# Patient Record
Sex: MALE | Race: WHITE | Employment: OTHER | URBAN - METROPOLITAN AREA
[De-identification: names, ages, dates, MRNs, and addresses within clinical notes are randomized per-mention and may not be internally consistent; named-entity substitution may affect disease eponyms.]

---

## 2021-11-08 ENCOUNTER — HOSPITAL ENCOUNTER (EMERGENCY)
Age: 66
Discharge: HOME | End: 2021-11-08
Attending: EMERGENCY MEDICINE
Payer: MEDICARE

## 2021-11-08 VITALS
RESPIRATION RATE: 16 BRPM | OXYGEN SATURATION: 100 % | BODY MASS INDEX: 22.4 KG/M2 | WEIGHT: 160 LBS | TEMPERATURE: 97.8 F | HEIGHT: 71 IN | DIASTOLIC BLOOD PRESSURE: 67 MMHG | HEART RATE: 60 BPM | SYSTOLIC BLOOD PRESSURE: 129 MMHG

## 2021-11-08 DIAGNOSIS — T40.601A OPIATE OVERDOSE, ACCIDENTAL OR UNINTENTIONAL, INITIAL ENCOUNTER (HCC): Primary | ICD-10-CM

## 2021-11-08 LAB
A/G RATIO: 2 (ref 1.1–2.2)
ALBUMIN SERPL-MCNC: 3.9 G/DL (ref 3.4–5)
ALP BLD-CCNC: 86 U/L (ref 40–129)
ALT SERPL-CCNC: 8 U/L (ref 10–40)
AMPHETAMINE SCREEN, URINE: ABNORMAL
ANION GAP SERPL CALCULATED.3IONS-SCNC: 11 MMOL/L (ref 3–16)
AST SERPL-CCNC: 7 U/L (ref 15–37)
BACTERIA: ABNORMAL /HPF
BARBITURATE SCREEN URINE: ABNORMAL
BASE EXCESS VENOUS: -1.7 MMOL/L (ref -3–3)
BASOPHILS ABSOLUTE: 0 K/UL (ref 0–0.2)
BASOPHILS RELATIVE PERCENT: 0.4 %
BENZODIAZEPINE SCREEN, URINE: ABNORMAL
BILIRUB SERPL-MCNC: 0.9 MG/DL (ref 0–1)
BILIRUBIN URINE: NEGATIVE
BLOOD, URINE: ABNORMAL
BUN BLDV-MCNC: 16 MG/DL (ref 7–20)
CALCIUM SERPL-MCNC: 8.6 MG/DL (ref 8.3–10.6)
CANNABINOID SCREEN URINE: ABNORMAL
CHLORIDE BLD-SCNC: 106 MMOL/L (ref 99–110)
CLARITY: CLEAR
CO2: 22 MMOL/L (ref 21–32)
COCAINE METABOLITE SCREEN URINE: ABNORMAL
COLOR: YELLOW
CREAT SERPL-MCNC: 0.8 MG/DL (ref 0.8–1.3)
EOSINOPHILS ABSOLUTE: 1 K/UL (ref 0–0.6)
EOSINOPHILS RELATIVE PERCENT: 17.1 %
EPITHELIAL CELLS, UA: ABNORMAL /HPF (ref 0–5)
FINE CASTS, UA: ABNORMAL /LPF (ref 0–2)
GFR AFRICAN AMERICAN: >60
GFR NON-AFRICAN AMERICAN: >60
GLUCOSE BLD-MCNC: 135 MG/DL (ref 70–99)
GLUCOSE URINE: NEGATIVE MG/DL
HCO3 VENOUS: 23.7 MMOL/L (ref 23–29)
HCT VFR BLD CALC: 33.6 % (ref 40.5–52.5)
HEMOGLOBIN: 11.8 G/DL (ref 13.5–17.5)
HYALINE CASTS: ABNORMAL /LPF (ref 0–2)
KETONES, URINE: NEGATIVE MG/DL
LACTIC ACID: 1.2 MMOL/L (ref 0.4–2)
LEUKOCYTE ESTERASE, URINE: NEGATIVE
LYMPHOCYTES ABSOLUTE: 1.1 K/UL (ref 1–5.1)
LYMPHOCYTES RELATIVE PERCENT: 18.6 %
Lab: ABNORMAL
MCH RBC QN AUTO: 33.2 PG (ref 26–34)
MCHC RBC AUTO-ENTMCNC: 35.1 G/DL (ref 31–36)
MCV RBC AUTO: 94.6 FL (ref 80–100)
METHADONE SCREEN, URINE: ABNORMAL
MICROSCOPIC EXAMINATION: YES
MONOCYTES ABSOLUTE: 0.2 K/UL (ref 0–1.3)
MONOCYTES RELATIVE PERCENT: 3 %
MUCUS: ABNORMAL /LPF
NEUTROPHILS ABSOLUTE: 3.7 K/UL (ref 1.7–7.7)
NEUTROPHILS RELATIVE PERCENT: 60.9 %
NITRITE, URINE: NEGATIVE
O2 SAT, VEN: 76 %
O2 THERAPY: ABNORMAL
OPIATE SCREEN URINE: ABNORMAL
OXYCODONE URINE: POSITIVE
PCO2, VEN: 41.5 MMHG (ref 40–50)
PDW BLD-RTO: 16.2 % (ref 12.4–15.4)
PH UA: 5
PH UA: 5 (ref 5–8)
PH VENOUS: 7.36 (ref 7.35–7.45)
PHENCYCLIDINE SCREEN URINE: ABNORMAL
PLATELET # BLD: 104 K/UL (ref 135–450)
PMV BLD AUTO: 10.1 FL (ref 5–10.5)
PO2, VEN: 42.4 MMHG (ref 25–40)
POTASSIUM REFLEX MAGNESIUM: 3.8 MMOL/L (ref 3.5–5.1)
PROPOXYPHENE SCREEN: ABNORMAL
PROTEIN UA: 30 MG/DL
RBC # BLD: 3.55 M/UL (ref 4.2–5.9)
RBC UA: ABNORMAL /HPF (ref 0–4)
SODIUM BLD-SCNC: 139 MMOL/L (ref 136–145)
SPECIFIC GRAVITY UA: >=1.03 (ref 1–1.03)
TCO2 CALC VENOUS: 24 MMOL/L
TOTAL PROTEIN: 5.9 G/DL (ref 6.4–8.2)
URINE TYPE: ABNORMAL
UROBILINOGEN, URINE: 0.2 E.U./DL
WBC # BLD: 6 K/UL (ref 4–11)
WBC UA: ABNORMAL /HPF (ref 0–5)

## 2021-11-08 PROCEDURE — 6360000002 HC RX W HCPCS: Performed by: EMERGENCY MEDICINE

## 2021-11-08 PROCEDURE — 80307 DRUG TEST PRSMV CHEM ANLYZR: CPT

## 2021-11-08 PROCEDURE — 83605 ASSAY OF LACTIC ACID: CPT

## 2021-11-08 PROCEDURE — 2580000003 HC RX 258: Performed by: EMERGENCY MEDICINE

## 2021-11-08 PROCEDURE — 99283 EMERGENCY DEPT VISIT LOW MDM: CPT

## 2021-11-08 PROCEDURE — 82803 BLOOD GASES ANY COMBINATION: CPT

## 2021-11-08 PROCEDURE — 96375 TX/PRO/DX INJ NEW DRUG ADDON: CPT

## 2021-11-08 PROCEDURE — 80053 COMPREHEN METABOLIC PANEL: CPT

## 2021-11-08 PROCEDURE — 81001 URINALYSIS AUTO W/SCOPE: CPT

## 2021-11-08 PROCEDURE — 96374 THER/PROPH/DIAG INJ IV PUSH: CPT

## 2021-11-08 PROCEDURE — 85025 COMPLETE CBC W/AUTO DIFF WBC: CPT

## 2021-11-08 RX ORDER — NITROGLYCERIN 0.4 MG/1
0.4 TABLET SUBLINGUAL EVERY 5 MIN PRN
COMMUNITY
Start: 2021-05-04

## 2021-11-08 RX ORDER — DIPHENHYDRAMINE HYDROCHLORIDE 50 MG/ML
25 INJECTION INTRAMUSCULAR; INTRAVENOUS ONCE
Status: COMPLETED | OUTPATIENT
Start: 2021-11-08 | End: 2021-11-08

## 2021-11-08 RX ORDER — ASPIRIN 325 MG
325 TABLET ORAL DAILY
COMMUNITY

## 2021-11-08 RX ORDER — TAMSULOSIN HYDROCHLORIDE 0.4 MG/1
0.4 CAPSULE ORAL DAILY
COMMUNITY
Start: 2021-03-08

## 2021-11-08 RX ORDER — DEXAMETHASONE 4 MG/1
40 TABLET ORAL WEEKLY
COMMUNITY
Start: 2021-07-13 | End: 2021-11-23

## 2021-11-08 RX ORDER — ONDANSETRON 8 MG/1
8 TABLET, ORALLY DISINTEGRATING ORAL 3 TIMES DAILY PRN
COMMUNITY
Start: 2021-10-18

## 2021-11-08 RX ORDER — OXYCODONE HYDROCHLORIDE 5 MG/1
5 TABLET ORAL EVERY 4 HOURS PRN
COMMUNITY

## 2021-11-08 RX ORDER — ATORVASTATIN CALCIUM 80 MG/1
80 TABLET, FILM COATED ORAL DAILY
COMMUNITY
Start: 2021-01-19

## 2021-11-08 RX ORDER — LENALIDOMIDE 25 MG/1
25 CAPSULE ORAL DAILY
COMMUNITY
Start: 2021-10-03

## 2021-11-08 RX ORDER — 0.9 % SODIUM CHLORIDE 0.9 %
1000 INTRAVENOUS SOLUTION INTRAVENOUS ONCE
Status: COMPLETED | OUTPATIENT
Start: 2021-11-08 | End: 2021-11-08

## 2021-11-08 RX ORDER — NALOXONE HYDROCHLORIDE 0.4 MG/ML
0.4 INJECTION, SOLUTION INTRAMUSCULAR; INTRAVENOUS; SUBCUTANEOUS PRN
Status: DISCONTINUED | OUTPATIENT
Start: 2021-11-08 | End: 2021-11-08 | Stop reason: HOSPADM

## 2021-11-08 RX ADMIN — NALOXONE HYDROCHLORIDE 0.4 MG: 0.4 INJECTION, SOLUTION INTRAMUSCULAR; INTRAVENOUS; SUBCUTANEOUS at 12:33

## 2021-11-08 RX ADMIN — DIPHENHYDRAMINE HYDROCHLORIDE 25 MG: 50 INJECTION, SOLUTION INTRAMUSCULAR; INTRAVENOUS at 12:28

## 2021-11-08 RX ADMIN — SODIUM CHLORIDE 1000 ML: 9 INJECTION, SOLUTION INTRAVENOUS at 12:29

## 2021-11-08 NOTE — ED PROVIDER NOTES
CRITICAL CARE NOTE  There was a high probability of clinical significant / life threatening deterioration in the patient's condition which required my urgent intervention. Total critical care time was at least 21  minutes excluding any separately reported procedures. TRIAGE CHIEF COMPLAINT:   Chief Complaint   Patient presents with    Drug Overdose       HPI: Huyen Winkler is a 77 y.o. male who presents to the emergency department with complaint of accidental overdose of his oxycodone 5 mg IR tablets. Patient has a history of multiple myeloma. He has chronic diarrhea. Every Monday morning he takes 4 mg Decadron X 10 tablets by mouth. He has intermittent back pain and takes oxycodone 5 mg IR tablets as needed. He never takes more than 1 or 2 tablets at a time. He last got a prescription in July for 60 tablets. This morning at 10 AM he accidentally took 10 of the oxycodone tablets instead of 10 of the Decadron tablets. He complains of mild dizziness and mild itching. Denies rash. No chest pain or shortness of breath. Denies any near syncope. He has no abdominal pain vomiting or bowel complaint. The patient is presenting here 2 hours and 15 minutes after the ingestion and is minimally symptomatic. REVIEW OF SYSTEMS:   10 systems reviewed. Pertinent positives per HPI. Otherwise noted to be negative. I have reviewed the triage/nursing documentation and agree unless otherwise noted below.     PAST MEDICAL HISTORY:   Past Medical History:   Diagnosis Date    Hyperlipidemia     Multiple myeloma ([facility] [address]         CURRENT MEDICATIONS:   Patient's Medications   New Prescriptions    No medications on file   Previous Medications    ASPIRIN 325 MG TABLET    Take 325 mg by mouth daily    ATORVASTATIN (LIPITOR) 80 MG TABLET    Take 80 mg by mouth daily    DEXAMETHASONE (DECADRON) 4 MG TABLET    Take 40 mg by mouth once a week    LENALIDOMIDE (REVLIMID) 25 MG CHEMO CAPSULE    Take 25 mg by mouth daily No erythema, No rash  Psychiatric:  Affect normal, Mood normal      EKG:    EKG interpreted by myself. Radiology:      LAB      ED COURSE & MEDICAL DECISION MAKING:  Pertinent Labs & Imaging studies reviewed. (See chart for details)  78-year-old male with history of multiple myeloma, on as needed oxycodone 5 mg IR tablets for pain, prescribed 4 mg Decadron 10 tablets by mouth every Monday morning, states he accidentally took 10 of the oxycodone tablets instead of 10 of the Decadron tablets at 10 AM today. Presents 2 hours and 15 minutes after the ingestion with minimal symptoms and normal vital signs/exam.  No myosis. No respiratory distress. He is alert and oriented x3 with no evidence of sedation. I did speak to [facility] at [address] [place] [address],[address] control at [phone] who recommended 4 to 6 hours of observation from the time of ingestion. The patient was given Benadryl 25 mg IV and Narcan 0.4 mg IV. IV fluids were started. Urinalysis was normal.  Urine drug screen was positive only for oxycodone. WBC was 6.0 and hemoglobin 11.8. CMP was unremarkable. Lactic acid was normal.    The patient was observed here for 4+ hours and remained stable. No evidence of narcotic toxidrome. He remained alert and oriented x3. He did drink p.o. fluids. No evidence of myosis or respiratory depression. He is neurologically intact and hemodynamically stable. He ambulated normally. Patient was advised not to take any more of his pain medication for least 24 hours. Recommended he could take his normal weekly morning Decadron dose tomorrow morning. Advise follow-up with his hematologist and return here for worse symptoms. I discussed with Marbella Unger the results of the evaluation in the Emergency Department, diagnosis, care, prognosis and the importance of follow-up. The patient is stable for discharge. The patient and/or family are in agreement with the plan and all questions have been answered.   Specific discharge instructions were explained, including reasons to return to the emergency department.         (Please note that portions of this note may have been completed with a voice recognition program.  Efforts were made to edit the dictation but occasionally words are mis-transcribed)      FINAL IMPRESSION:  1 --accidental opiate overdose                  Jony Begum MD  11/09/21 9421

## 2021-11-08 NOTE — ED PROVIDER NOTES
Emergency Department Encounter  [address]     Patient: Rashaun Chisholm  MRN: [id number]  : 1955  Date of Evaluation: 2021  ED Provider: Corina Barber DO    Chief Complaint       Chief Complaint   Patient presents with    Drug Overdose     [place] a 77 y.o. male who presents to the emergency department after accidental ingestion of 50 mg of oxycodone. Pt has pmhx of MM where he takes 40 mg of dexamethasone every Monday. Today, however,he got his medicine mixed up and accidentally took 50 mg of oxycodone instead around 10 am. He is prescribed 5 mg of IR oxycodone tablets that he takes as needed for pain. He roughly takes 1-2 tablets each time for back pain. His last prescription was dated from July where he got 60 tablets. Overall pt is feeling well. He states that he gets dizzy even when he takes 1-2, and had some dizziness earlier today. Currently he is not experiencing any dizziness. He also states that the medicine makes him \"itchy\" and he is having some nose itching. He denies any dysphagia or managing secretions. No rash or other lesions noted. No fever/chills, chest pain, SOB, abdominal pain, N/V, or urinary complaints. He denies loss of consciousness,memory loss, forgetfulness, periods of confusion, difficulty concentrating, seizures, decline in intellect, nervousness, insomina, aphasia, or dysarthria. ROS:     At least 10 systems reviewed and otherwise acutely negative except as in the [address] Past History     Past Medical History:   Diagnosis Date    Hyperlipidemia     Multiple myeloma ([facility] [address]      History reviewed. No pertinent surgical history.   Social History     Socioeconomic History    Marital status:      Spouse name: None    Number of children: None    Years of education: None    Highest education level: None   Occupational History    None   Tobacco Use    Smoking status: Never Smoker    Smokeless tobacco: Never Used Substance and Sexual Activity    Alcohol use: Never    Drug use: Never    Sexual activity: None   Other Topics Concern    None   Social History Narrative    None     Social Determinants of Health     Financial Resource Strain:     Difficulty of Paying Living Expenses: Not on file   Food Insecurity:     Worried About Running Out of Food in the Last Year: Not on file    Yordan of Food in the Last Year: Not on file   Transportation Needs:     Lack of Transportation (Medical): Not on file    Lack of Transportation (Non-Medical):  Not on file   Physical Activity:     Days of Exercise per Week: Not on file    Minutes of Exercise per Session: Not on file   Stress:     Feeling of Stress : Not on file   Social Connections:     Frequency of Communication with Friends and Family: Not on file    Frequency of Social Gatherings with Friends and Family: Not on file    Attends [employer] Services: Not on file    Active Member of [address] [place] [address] or Organizations: Not on file    Attends Club or Organization Meetings: Not on file    Marital Status: Not on file   Intimate Partner Violence:     Fear of Current or Ex-Partner: Not on file    Emotionally Abused: Not on file    Physically Abused: Not on file    Sexually Abused: Not on file   Housing Stability:     Unable to Pay for Housing in the Last Year: Not on file    [facility] of [facility] in the [facility] Year: Not on file    Unstable Housing in the Last Year: Not on file       Medications/Allergies     Previous Medications    ASPIRIN 325 MG TABLET    Take 325 mg by mouth daily    ATORVASTATIN (LIPITOR) 80 MG TABLET    Take 80 mg by mouth daily    DEXAMETHASONE (DECADRON) 4 MG TABLET    Take 40 mg by mouth once a week    LENALIDOMIDE (REVLIMID) 25 MG CHEMO CAPSULE    Take 25 mg by mouth daily    NITROGLYCERIN (NITROSTAT) 0.4 MG SL TABLET    Place 0.4 mg under the tongue every 5 minutes as needed    ONDANSETRON (ZOFRAN-ODT) 8 MG TBDP DISINTEGRATING TABLET    Take 8 mg by mouth 3 times daily as needed    OXYCODONE (ROXICODONE) 5 MG IMMEDIATE RELEASE TABLET    Take 5 mg by mouth every 4 hours as needed for Pain. TAMSULOSIN (FLOMAX) 0.4 MG CAPSULE    Take 0.4 mg by mouth daily     No Known Allergies     Physical Exam       ED Triage Vitals [11/08/21 1151]   BP Temp Temp Source Pulse Resp SpO2 Height Weight   138/76 97.8 °F (36.6 °C) Oral 72 16 98 % 5' 11\" (1.803 m) 160 lb (72.6 kg)     Physical Examination:   · General appearance: alert, appears stated age and cooperative  · Skin: Skin color, texture, turgor normal.   · HEENT: PERRLA Normocephalic, no lesions, without obvious abnormality. · Neck: no adenopathy,  trachea midline and thyroid, symmetric, no tenderness/mass/nodules  · Lungs: clear to auscultation bilaterally, equal chest expansion  · Heart: regular rate and rhythm, S1, S2 normal, no murmur  · Abdomen: soft, non-tender; bowel sounds normal; no masses,  no organomegaly  · Extremities: extremities normal, atraumatic, no cyanosis or edema  · Lymphatic: No significant lymph node enlargement papable  · Neurologic: CN II-XII grossly intact.   Mental status: Alert, oriented, thought content appropriate      Diagnostics   Labs:  Results for orders placed or performed during the hospital encounter of 11/08/21   CBC Auto Differential   Result Value Ref Range    WBC 6.0 4.0 - 11.0 K/uL    RBC 3.55 (L) 4.20 - 5.90 M/uL    Hemoglobin 11.8 (L) 13.5 - 17.5 g/dL    Hematocrit 33.6 (L) 40.5 - 52.5 %    MCV 94.6 80.0 - 100.0 fL    MCH 33.2 26.0 - 34.0 pg    MCHC 35.1 31.0 - 36.0 g/dL    RDW 16.2 (H) 12.4 - 15.4 %    Platelets 290 (L) 596 - 450 K/uL    MPV 10.1 5.0 - 10.5 fL    Neutrophils % 60.9 %    Lymphocytes % 18.6 %    Monocytes % 3.0 %    Eosinophils % 17.1 %    Basophils % 0.4 %    Neutrophils Absolute 3.7 1.7 - 7.7 K/uL    Lymphocytes Absolute 1.1 1.0 - 5.1 K/uL    Monocytes Absolute 0.2 0.0 - 1.3 K/uL    Eosinophils Absolute 1.0 (H) 0.0 - 0.6 K/uL    Basophils Absolute 0.0 0.0 - 0.2 K/uL   Comprehensive Metabolic Panel w/ Reflex to MG   Result Value Ref Range    Sodium 139 136 - 145 mmol/L    Potassium reflex Magnesium 3.8 3.5 - 5.1 mmol/L    Chloride 106 99 - 110 mmol/L    CO2 22 21 - 32 mmol/L    Anion Gap 11 3 - 16    Glucose 135 (H) 70 - 99 mg/dL    BUN 16 7 - 20 mg/dL    CREATININE 0.8 0.8 - 1.3 mg/dL    GFR Non-African American >60 >60    GFR African American >60 >60    Calcium 8.6 8.3 - 10.6 mg/dL    Total Protein 5.9 (L) 6.4 - 8.2 g/dL    Albumin 3.9 3.4 - 5.0 g/dL    Albumin/Globulin Ratio 2.0 1.1 - 2.2    Total Bilirubin 0.9 0.0 - 1.0 mg/dL    Alkaline Phosphatase 86 40 - 129 U/L    ALT 8 (L) 10 - 40 U/L    AST 7 (L) 15 - 37 U/L   Blood Gas, Venous   Result Value Ref Range    pH, García 7.364 7.350 - 7.450    pCO2, García 41.5 40.0 - 50.0 mmHg    pO2, García 42.4 (H) 25.0 - 40.0 mmHg    HCO3, Venous 23.7 23.0 - 29.0 mmol/L    Base Excess, García -1.7 -3.0 - 3.0 mmol/L    O2 Sat, García 76 Not Established %    TC02 (Calc), García 24 Not Established mmol/L    O2 Therapy Unknown    Lactic Acid, Plasma   Result Value Ref Range    Lactic Acid 1.2 0.4 - 2.0 mmol/L   Urinalysis, reflex to microscopic   Result Value Ref Range    Color, UA Yellow Straw/Yellow    Clarity, UA Clear Clear    Glucose, Ur Negative Negative mg/dL    Bilirubin Urine Negative Negative    Ketones, Urine Negative Negative mg/dL    Specific Gravity, UA >=1.030 1.005 - 1.030    Blood, Urine SMALL (A) Negative    pH, UA 5.0 5.0 - 8.0    Protein, UA 30 (A) Negative mg/dL    Urobilinogen, Urine 0.2 <2.0 E.U./dL    Nitrite, Urine Negative Negative    Leukocyte Esterase, Urine Negative Negative    Microscopic Examination YES     Urine Type NotGiven    Urine Drug Screen   Result Value Ref Range    Amphetamine Screen, Urine Neg Negative <1000ng/mL    Barbiturate Screen, Ur Neg Negative <200 ng/mL    Benzodiazepine Screen, Urine Neg Negative <200 ng/mL    Cannabinoid Scrn, Ur Neg Negative <50 ng/mL    Cocaine Metabolite Screen, Urine Neg Negative <300 ng/mL    Opiate Scrn, Ur Neg Negative <300 ng/mL    PCP Screen, Urine Neg Negative <25 ng/mL    Methadone Screen, Urine Neg Negative <300 ng/mL    Propoxyphene Scrn, Ur Neg Negative <300 ng/mL    Oxycodone Urine POSITIVE (A) Negative <100 ng/ml    pH, UA 5.0     Drug Screen Comment: see below    Microscopic Urinalysis   Result Value Ref Range    Hyaline Casts, UA 0-2 0 - 2 /LPF    Fine Casts, UA 0-2 0 - 2 /LPF    Mucus, UA 1+ (A) None Seen /LPF    WBC, UA 0-2 0 - 5 /HPF    RBC, UA 3-4 0 - 4 /HPF    Epithelial Cells, UA 0-1 0 - 5 /HPF    Bacteria, UA 2+ (A) None Seen /HPF     Radiographs:  No results found. ED Course and MDM   In brief, Roney Mortimer is a 77 y.o. male who presented to the emergency department after ingesting 50 mg of oxycodone. On exam, patient is afebrile to. Oxygenating well. Appears well, in no acute distress. Normal respiratory effort. Chest clear to auscultation. Exam otherwise unremarkable. Pt was given 1 L fluids, 25 mg benadryl, and 0.4mg of narcan for symptom control. Pt tolerated medication well. He was monitored for 4 hours per poison control recommendation. Impression is accidental overdose of oxycodone, pt stable. Due to overall well-appearance, relatively short duration of symptoms and reassuring exam, doubt serious respiratory compromise. Will not obtain CXR, UA, or other studies at this time. Plan is to follow up with PCP in 24-48 hours, return in the interim for worsening of condition.       ED Medication Orders (From admission, onward)    Start Ordered     Status Ordering Provider    11/08/21 1215 11/08/21 1210  0.9 % sodium chloride bolus  ONCE         Last MAR action: New Bag - by Barbara Natarajan on 11/08/21 at 1229 [address] A    11/08/21 1215 11/08/21 1210  diphenhydrAMINE (BENADRYL) injection 25 mg  ONCE         Last MAR action: Given - by Barbara Natarajan on 11/08/21 at 3212 [address] [place] [address] A    11/08/21 1210 11/08/21 1210  naloxone [facility]) injection 0.4 mg  PRN         Last MAR action: Given - by Diana Lipscomb on 11/08/21 at [address] A          Final Impression      1.  Opiate overdose, accidental or unintentional, initial encounter [facility])        MD Vicki Mcgraw  [phone]    In 3 days      New Prescriptions    No medications on file         Nasim Pruitt, [address] [place] [address] Medicine PGY-2     Nasim Pruitt DO  Resident  11/08/21 5163

## 2021-11-08 NOTE — ED NOTES
Poison control called back to check on patient. Informed patient has been alert with stable vitals throughout visit. Tolerating PO (coffee). Poison  states patient needs monitored for 4-6 hours after Narcan administration. OK to go ahead and give his Dexamethasone dose if needed. Dr. Char Guerra informed.      Cale Newell RN  11/08/21 1226